# Patient Record
Sex: FEMALE | Race: WHITE | NOT HISPANIC OR LATINO | Employment: OTHER | URBAN - METROPOLITAN AREA
[De-identification: names, ages, dates, MRNs, and addresses within clinical notes are randomized per-mention and may not be internally consistent; named-entity substitution may affect disease eponyms.]

---

## 2018-01-12 NOTE — PATIENT DISCUSSION
DRY EYE SYNDROME, OU: PRESCRIBED ARTIFICIAL TEARS BID - QID, OU. RETURN FOR FOLLOW-UP AS SCHEDULED OR SOONER IF SYMPTOMS WORSEN.

## 2018-01-12 NOTE — PATIENT DISCUSSION
MYOPIA (progressive high) OU: PRESCRIBED GLASSES AND CONTACTS. PATIENT PREFERS -9.50 OU. FOLLOW-UP AS SCHEDULED.

## 2020-07-09 ENCOUNTER — NEW PATIENT COMPREHENSIVE (OUTPATIENT)
Dept: URBAN - METROPOLITAN AREA CLINIC 46 | Facility: CLINIC | Age: 55
End: 2020-07-09

## 2020-07-09 DIAGNOSIS — H52.7: ICD-10-CM

## 2020-07-09 PROCEDURE — 92015 DETERMINE REFRACTIVE STATE: CPT

## 2020-07-09 PROCEDURE — 92004 COMPRE OPH EXAM NEW PT 1/>: CPT

## 2020-07-09 ASSESSMENT — TONOMETRY
OD_IOP_MMHG: 17
OS_IOP_MMHG: 17

## 2020-07-09 ASSESSMENT — VISUAL ACUITY
OD_SC: CF 5FT
OD_CC: J8
OS_SC: J5
OD_SC: J3
OS_CC: J8
OD_CC: 20/40
OS_CC: 20/30
OS_SC: CF 6FT

## 2022-04-21 ENCOUNTER — COMPREHENSIVE EXAM (OUTPATIENT)
Dept: URBAN - METROPOLITAN AREA CLINIC 46 | Facility: CLINIC | Age: 57
End: 2022-04-21

## 2022-04-21 DIAGNOSIS — H52.7: ICD-10-CM

## 2022-04-21 PROCEDURE — 92015 DETERMINE REFRACTIVE STATE: CPT

## 2022-04-21 PROCEDURE — 92014 COMPRE OPH EXAM EST PT 1/>: CPT

## 2022-04-21 ASSESSMENT — VISUAL ACUITY
OD_SC: J5
OD_CC: 20/40
OS_CC: J6
OS_SC: J5
OS_SC: CF 5FT
OD_CC: J5
OD_SC: CF 5FT
OS_CC: 20/30

## 2022-04-21 ASSESSMENT — TONOMETRY
OS_IOP_MMHG: 17
OD_IOP_MMHG: 17

## 2023-04-27 ENCOUNTER — COMPREHENSIVE EXAM (OUTPATIENT)
Dept: URBAN - METROPOLITAN AREA CLINIC 46 | Facility: CLINIC | Age: 58
End: 2023-04-27

## 2023-04-27 DIAGNOSIS — H52.7: ICD-10-CM

## 2023-04-27 DIAGNOSIS — H40.013: ICD-10-CM

## 2023-04-27 PROCEDURE — 92014 COMPRE OPH EXAM EST PT 1/>: CPT

## 2023-04-27 PROCEDURE — 92015 DETERMINE REFRACTIVE STATE: CPT

## 2023-04-27 PROCEDURE — 92133 CPTRZD OPH DX IMG PST SGM ON: CPT

## 2023-04-27 ASSESSMENT — VISUAL ACUITY
OS_SC: J1 @ 8"
OS_SC: CF 6FT
OD_SC: CF 6FT
OD_SC: J1 @ 7"
OD_BAT: 20/50
OD_CC: J10
OD_CC: 20/25
OS_BAT: 20/40
OS_CC: 20/40
OS_CC: J12

## 2023-04-27 ASSESSMENT — TONOMETRY
OD_IOP_MMHG: 18
OS_IOP_MMHG: 17

## 2024-05-02 ENCOUNTER — COMPREHENSIVE EXAM (OUTPATIENT)
Dept: URBAN - METROPOLITAN AREA CLINIC 46 | Facility: CLINIC | Age: 59
End: 2024-05-02

## 2024-05-02 DIAGNOSIS — H40.013: ICD-10-CM

## 2024-05-02 DIAGNOSIS — H52.7: ICD-10-CM

## 2024-05-02 PROCEDURE — 92015 DETERMINE REFRACTIVE STATE: CPT

## 2024-05-02 PROCEDURE — 92014 COMPRE OPH EXAM EST PT 1/>: CPT

## 2024-05-02 PROCEDURE — 92133 CPTRZD OPH DX IMG PST SGM ON: CPT

## 2024-05-02 ASSESSMENT — VISUAL ACUITY
OS_CC: 20/20
OD_CC: 20/30
OS_CC: J3
OD_CC: J5
OD_SC: CF 5FT
OS_SC: CF 5FT

## 2024-05-02 ASSESSMENT — TONOMETRY
OD_IOP_MMHG: 16
OS_IOP_MMHG: 14